# Patient Record
Sex: FEMALE | Race: WHITE | NOT HISPANIC OR LATINO | Employment: UNEMPLOYED | URBAN - METROPOLITAN AREA
[De-identification: names, ages, dates, MRNs, and addresses within clinical notes are randomized per-mention and may not be internally consistent; named-entity substitution may affect disease eponyms.]

---
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
## 2025-01-16 ENCOUNTER — TELEPHONE (OUTPATIENT)
Age: 20
End: 2025-01-16

## 2025-01-16 NOTE — TELEPHONE ENCOUNTER
"Behavioral Health Outpatient Intake Questions    Referred By   : SELF    Please advise interviewee that they need to answer all questions truthfully to allow for best care, and any misrepresentations of information may affect their ability to be seen at this clinic   => Was this discussed? Yes     If Minor Child (under age 18)    Who is/are the legal guardian(s) of the child?     Is there a custody agreement?      If \"YES\"- Custody orders must be obtained prior to scheduling the first appointment  In addition, Consent to Treatment must be signed by all legal guardians prior to scheduling the first appointment    If \"NO\"- Consent to Treatment must be signed by all legal guardians prior to scheduling the first appointment    Behavioral Health Outpatient Intake History -     Presenting Problem (in patient's own words): bad anxiety and panic attacks    Are there any communication barriers for this patient?     No                                               If yes, please describe barriers:   If there is a unique situation, please refer to Spencer Spears/Katja Mendoza for final determination.    Are you taking any psychiatric medications? No     If \"YES\" -What are they      If \"YES\" -Who prescribes?     Has the Patient previously received outpatient Talk Therapy or Medication Management from [facility]  No        If \"YES\"- When, Where and with Whom?         If \"NO\" -Has Patient received these services elsewhere?       If \"YES\" -When, Where, and with Whom?    Has the Patient abused alcohol or other substances in the last 6 months ? No  No concerns of substance abuse are reported.     If \"YES\" -What substance, How much, How often?     If illegal substance: Refer to [facility] (for [facility]) or [facility].   If Alcohol in excess of 10 drinks per week:  Refer to [facility] (for [facility]) or [facility]    Legal History-     Is this treatment court ordered? No   If \"yes \"send to :  Talk Therapy : Send to Spencer Spears " "for final determination   Med Management: Send to Dr. Light for final determination     Has the Patient been convicted of a felony?  No   If \"Yes\" send to -When, What?  Talk Therapy: Send to Spencer Spears for final determination   Med Management: Send to Dr. Light for final determination     ACCEPTED as a patient Yes  If \"Yes\" Appointment Date:   Olivier Toro 4/8 @ 11a    Referred Elsewhere? No  If “Yes” - (Where? Ex: [facility], [facility], [facility], [facility], etc.)       Name of Insurance Co:[facility]  Insurance ID#[id number]   Insurance Phone #  If ins is primary or secondary?  If patient is a minor, parents information such as Name, D.O.B of guarantor.  "

## 2025-01-22 ENCOUNTER — TELEPHONE (OUTPATIENT)
Dept: PSYCHIATRY | Facility: CLINIC | Age: 20
End: 2025-01-22

## 2025-01-22 NOTE — TELEPHONE ENCOUNTER
Forms sent via [email or web address].    Parent/guardian of patient aware that forms should be completed via [email or web address] prior to appt.

## 2025-03-31 ENCOUNTER — OFFICE VISIT (OUTPATIENT)
Dept: FAMILY MEDICINE CLINIC | Facility: CLINIC | Age: 20
End: 2025-03-31

## 2025-03-31 VITALS
HEIGHT: 60 IN | DIASTOLIC BLOOD PRESSURE: 68 MMHG | BODY MASS INDEX: 24.94 KG/M2 | OXYGEN SATURATION: 98 % | SYSTOLIC BLOOD PRESSURE: 102 MMHG | TEMPERATURE: 98.5 F | WEIGHT: 127 LBS | HEART RATE: 111 BPM

## 2025-03-31 DIAGNOSIS — Z02.4 DRIVER'S PERMIT PE (PHYSICAL EXAMINATION): Primary | ICD-10-CM

## 2025-03-31 PROCEDURE — 99499 UNLISTED E&M SERVICE: CPT | Performed by: PHYSICIAN ASSISTANT

## 2025-03-31 NOTE — PROGRESS NOTES
"Name: Kiana Claudio      : 2005      MRN: [id number]  Encounter Provider: Justina Vergara PA-C  Encounter Date: 3/31/2025   Encounter department: [facility]  :  Assessment & Plan  's permit PE (physical examination)  Drivers permit physical form completed.             Depression Screening and Follow-up Plan: Patient was screened for depression during today's encounter. They screened negative with a PHQ-2 score of 0.        History of Present Illness   Kiana is here today for 's permit physical.      Review of Systems   Constitutional:  Negative for chills and fever.   HENT:  Negative for ear pain and sore throat.    Eyes:  Negative for pain and visual disturbance.   Respiratory:  Negative for cough and shortness of breath.    Cardiovascular:  Negative for chest pain and palpitations.   Gastrointestinal:  Negative for abdominal pain and vomiting.   Genitourinary:  Negative for dysuria and hematuria.   Musculoskeletal:  Negative for arthralgias and back pain.   Skin:  Negative for color change and rash.   Neurological:  Negative for seizures and syncope.   All other systems reviewed and are negative.      Objective   /68 (BP Location: Left arm, Patient Position: Sitting)   Pulse (!) 111   Temp 98.5 °F (36.9 °C) (Temporal)   Ht 4' 11.5\" (1.511 m)   Wt 57.6 kg (127 lb)   SpO2 98%   BMI 25.22 kg/m²      Physical Exam  Vitals reviewed.   Constitutional:       General: She is not in acute distress.     Appearance: She is well-developed. She is not diaphoretic.   HENT:      Head: Normocephalic and atraumatic.      Right Ear: Hearing, tympanic membrane, ear canal and external ear normal.      Left Ear: Hearing, tympanic membrane, ear canal and external ear normal.      Nose: Nose normal.      Mouth/Throat:      Mouth: Mucous membranes are moist.      Pharynx: Oropharynx is clear. Uvula midline. No oropharyngeal exudate.   Eyes:      General: No scleral icterus.        Right eye: No " discharge.         Left eye: No discharge.      Conjunctiva/sclera: Conjunctivae normal.   Neck:      Thyroid: No thyromegaly.      Vascular: No carotid bruit.   Cardiovascular:      Rate and Rhythm: Normal rate and regular rhythm.      Heart sounds: Normal heart sounds. No murmur heard.  Pulmonary:      Effort: Pulmonary effort is normal. No respiratory distress.      Breath sounds: Normal breath sounds. No wheezing.   Abdominal:      General: Bowel sounds are normal. There is no distension.      Palpations: Abdomen is soft. There is no mass.      Tenderness: There is no abdominal tenderness. There is no guarding or rebound.   Musculoskeletal:         General: No tenderness. Normal range of motion.      Cervical back: Neck supple.   Lymphadenopathy:      Cervical: No cervical adenopathy.   Skin:     General: Skin is warm and dry.      Findings: No erythema or rash.   Neurological:      Mental Status: She is alert and oriented to person, place, and time.   Psychiatric:         Behavior: Behavior normal.         Thought Content: Thought content normal.         Judgment: Judgment normal.

## 2025-04-04 ENCOUNTER — TELEPHONE (OUTPATIENT)
Age: 20
End: 2025-04-04

## 2025-04-04 NOTE — TELEPHONE ENCOUNTER
Mother of patient called in to confirm the patients new patient medication management appointment on 4/8 @11    Motherjoseyo inquired about the patietn receiving talk therapy services.     Writer stated the intake department would reach out when those services become available to establish.

## 2025-04-08 ENCOUNTER — OFFICE VISIT (OUTPATIENT)
Dept: PSYCHIATRY | Facility: CLINIC | Age: 20
End: 2025-04-08
Payer: COMMERCIAL

## 2025-04-08 DIAGNOSIS — Z13.29 SCREENING FOR ENDOCRINE, NUTRITIONAL, METABOLIC AND IMMUNITY DISORDER: ICD-10-CM

## 2025-04-08 DIAGNOSIS — F41.0 GENERALIZED ANXIETY DISORDER WITH PANIC ATTACKS: ICD-10-CM

## 2025-04-08 DIAGNOSIS — Z13.228 SCREENING FOR ENDOCRINE, NUTRITIONAL, METABOLIC AND IMMUNITY DISORDER: ICD-10-CM

## 2025-04-08 DIAGNOSIS — Z13.21 SCREENING FOR ENDOCRINE, NUTRITIONAL, METABOLIC AND IMMUNITY DISORDER: ICD-10-CM

## 2025-04-08 DIAGNOSIS — F33.1 MODERATE RECURRENT MAJOR DEPRESSION (HCC): Primary | ICD-10-CM

## 2025-04-08 DIAGNOSIS — Z13.0 SCREENING FOR ENDOCRINE, NUTRITIONAL, METABOLIC AND IMMUNITY DISORDER: ICD-10-CM

## 2025-04-08 DIAGNOSIS — F41.1 GENERALIZED ANXIETY DISORDER WITH PANIC ATTACKS: ICD-10-CM

## 2025-04-08 PROCEDURE — 90792 PSYCH DIAG EVAL W/MED SRVCS: CPT | Performed by: PHYSICIAN ASSISTANT

## 2025-04-08 PROCEDURE — 96127 BRIEF EMOTIONAL/BEHAV ASSMT: CPT | Performed by: PHYSICIAN ASSISTANT

## 2025-04-08 RX ORDER — SERTRALINE HYDROCHLORIDE 25 MG/1
25 TABLET, FILM COATED ORAL DAILY
Qty: 30 TABLET | Refills: 1 | Status: SHIPPED | OUTPATIENT
Start: 2025-04-08 | End: 2025-06-07

## 2025-04-08 RX ORDER — HYDROXYZINE HYDROCHLORIDE 25 MG/1
25 TABLET, FILM COATED ORAL EVERY 12 HOURS PRN
Qty: 30 TABLET | Refills: 1 | Status: SHIPPED | OUTPATIENT
Start: 2025-04-08 | End: 2025-06-07

## 2025-04-08 NOTE — BH CRISIS PLAN
Client Name: Kiana Claudio       Client YOB: 2005    Ora Safety Plan      Creation Date: 4/8/25    Created By: Olivier Toro PA-C       Step 1: Warning Signs:   Warning Signs   Isolation            Step 2: Internal Coping Strategies:   Internal Coping Strategies   Listen to music   Reading            Step 3: People and social settings that provide distraction:   Name Contact Information   Mother    Close friends             Step 4: People whom I can ask for help during a crisis:      Name Contact Information    Mother and close friends       Step 5: Professionals or agencies I can contact during a crisis:      Clinican/Agency Name Phone Emergency Contact    Crisis hotline 988     Crisis walk-in clinic        Local Emergency Department Emergency Department Phone Emergency Department Address    [facility]          Crisis Phone Numbers:   Suicide Prevention Lifeline: Call or Text  988 Crisis Text Line: Text HOME to 114-121   Please note: Some [facility] do not have a separate number for Child/Adolescent specific crisis. If your county is not listed under Child/Adolescent, please call the adult number for your county      Adult Crisis Numbers: Child/Adolescent Crisis Numbers   [facility]: [phone] [facility]: [phone]   [facility]: [phone] [facility]: [phone]   [facility]: [phone] [place]: [phone]   [facility]: [phone] [facility]: [phone]   [facility]: [phone]   [facility]: [phone]   [facility]: [phone]   [facility]: [phone] (daytime) [phone] (after hours, weekends, holidays)      Step 6: Making the environment safer (plan for lethal means safety):   Patient did not identify any lethal methods: Yes     Optional: What is most important to me and worth living for?      Ora Safety Plan. Marah Granados and David Freire.  Used with permission of the authors.

## 2025-04-08 NOTE — PSYCH
PSYCHIATRIC EVALUATION     Name: Kiana Claudio      : 2005      MRN: [id number]  Encounter Provider: Olivier Toro PA-C  Encounter Date: 2025   Encounter department: [facility]    Insurance: Payor: [facility] / Plan: [facility] / Product Type: [facility] /      Reason for visit:   Chief Complaint   Patient presents with    Establish Care   :  Assessment & Plan  Moderate recurrent major depression (HCC)  Start Zoloft 25 mg daily.  Risks and potential side effects of medication discussed with patient including serotonin syndrome, SIADH, worsening depression, suicidality, induction of vitor, GI upset, headaches, activation, sexual side effects, sedation, potential drug interactions, and others. Patient expressed understanding.    Patient contracts for safety denies SI.  Safety plan completed.  Treatment plan completed.  Follow-up in 1 month or sooner if needed.  Call with any questions or concerns.  Patient is on psychotherapy wait list.  Orders:    sertraline (Zoloft) 25 mg tablet; Take 1 tablet (25 mg total) by mouth daily    Comprehensive metabolic panel    CBC and differential    TSH, 3rd generation with Free T4 reflex; Future    Vitamin D 25 hydroxy; Future    Generalized anxiety disorder with panic attacks  Start Zoloft 25 mg daily  Atarax as needed  Orders:    sertraline (Zoloft) 25 mg tablet; Take 1 tablet (25 mg total) by mouth daily    hydrOXYzine HCL (ATARAX) 25 mg tablet; Take 1 tablet (25 mg total) by mouth every 12 (twelve) hours as needed for anxiety (Sleep)    Comprehensive metabolic panel    CBC and differential    TSH, 3rd generation with Free T4 reflex; Future    Vitamin D 25 hydroxy; Future    Screening for endocrine, nutritional, metabolic and immunity disorder    Orders:    Comprehensive metabolic panel    CBC and differential    TSH, 3rd generation with Free T4 reflex; Future    Vitamin D 25 hydroxy;  Future        Treatment Recommendations/Precautions:    Educated about diagnosis and treatment modalities. Verbalizes understanding and agreement with the treatment plan.  Discussed self monitoring of symptoms, and symptom monitoring tools.  Discussed medications and if treatment adjustment was needed or desired.  Medication management every 1 month  Aware of 24 hour and weekend coverage for urgent situations accessed by calling [facility] main practice number  I am scheduling this patient out for greater than 3 months: No    Medications Risks/Benefits:      Risks, Benefits And Possible Side Effects Of Medications:    Risks, benefits, and possible side effects of medications explained to Kiana and she (or legal representative) verbalizes understanding and agreement for treatment.    Controlled Medication Discussion:     Not applicable - controlled prescriptions are not prescribed by this practice.      History of Present Illness     Kiana seen today for psychiatric evaluation and medication management.  Patient reporting significant anxiety with panic attacks and depression symptoms for greater than 2 years.  Patient states waxing and waning in nature.  Also reporting some sleep disturbances for approximately 1 year.  Patient reporting completed 1 year of college up in [place] however had to leave school due to cyber bullying.  Patient states was receiving messages to kill herself and take her own life.  Patient states would like to return to school or join the  in the future.  Patient reports anxiety depression are somewhat better than initially during these bullying episodes.  Patient reports no history of psychotropic medications.  Denies any prior psychiatric admissions or visits with psychiatrist.  Reports in the past has had passive SI without plan to hurt herself with last episode greater than 2 months ago.  Denies any suicide attempts.  Denies any family history of  psychiatric issues.  Reports some of her symptoms also occurred when a good friend of hers committed suicide 2 years ago.  Patient is on psychotherapy and the wait list.  Patient states willing to start medications if necessary.    Psychiatric Review Of Systems:    Sleep changes: decreased  Appetite changes: no  Weight changes: no  Energy/anergy: yes, decreased  Interest/pleasure/anhedonia: yes  Somatic symptoms: no  Anxiety/panic: yes  Jazmín: no  Guilty/hopeless: yes  Self injurious behavior/risky behavior: no  Suicidal ideation:  Less passive SI without plan was over 2 months ago.  Homicidal ideation: no  Auditory hallucinations: no   Visual hallucinations: no  Other hallucinations: no  Delusional thinking: no  Eating disorder history: no  Obsessive/compulsive symptoms: no  Pertinent items are noted in HPI; all others negative    Review Of Systems: A review of systems is obtained and is negative except for the pertinent positives listed in HPI/Subjective above.      Current Rating Scores:     See results posted in chart  PHQ-9 Depression Screening    Little interest or pleasure in doing things: 2 - more than half the days  Feeling down, depressed, or hopeless: 2 - more than half the days  Trouble falling or staying asleep, or sleeping too much: 2 - more than half the days  Feeling tired or having little energy: 2 - more than half the days  Poor appetite or overeatin - more than half the days  Feeling bad about yourself - or that you are a failure or have let yourself or your family down: 2 - more than half the days  Trouble concentrating on things, such as reading the newspaper or watching television: 0 - not at all  Moving or speaking so slowly that other people could have noticed. Or the opposite - being so fidgety or restless that you have been moving around a lot more than usual: 0 - not at all  Thoughts that you would be better off dead, or of hurting yourself in some way: 0 - not at all  PHQ-9 Score:  12  Score Interpretation: Moderate depression        DARREN-7 Flowsheet Screening      Flowsheet Row Most Recent Value   Over the last two weeks, how often have you been bothered by the following problems?     Feeling nervous, anxious, or on edge 3   Not being able to stop or control worrying 2   Worrying too much about different things 3   Trouble relaxing  2   Being so restless that it's hard to sit still 1   Becoming easily annoyed or irritable  0   Feeling afraid as if something awful might happen 2   How difficult have these problems made it for you to do your work, take care of things at home, or get along with other people?  Not difficult at all   DARREN Score  13           Areas of Improvement:  Initial evaluation and reviewed in HPI/Subjective Section      Historical Information      Past Psychiatric History:     Past Inpatient Psychiatric Treatment:   No history of past inpatient psychiatric admissions  Past Outpatient Psychiatric Treatment:    No history of past outpatient psychiatric treatment  Past Suicide Attempts: no  Past Violent Behavior: no  Past Psychiatric Medication Trials: none    Traumatic History:     Abuse: Reports cyber bullying while freshman at [employer].  Other Traumatic Events: none    Family Psychiatric History:     Family History   Problem Relation Age of Onset    No Known Problems Mother     No Known Problems Father        Substance Use History:    Tobacco, Alcohol and Drug Use History     Tobacco Use    Smoking status: Never    Smokeless tobacco: Never   Vaping Use    Vaping status: Never Used   Substance Use Topics    Alcohol use: Never    Drug use: Never     Alcohol Use: Not At Risk (8/4/2020)    AUDIT-C     Frequency of Alcohol Consumption: Never     Additional Substance Use Detail:    Alcohol use: denies use  Recreational drug use:   Cocaine: denies use  Heroin: denies use  Cannabis: denies use  Other drugs:   denies use  Longest clean time: not applicable  History of  Inpatient/Outpatient rehabilitation program: no  Smoking history: denies use  Use of caffeine:  Rare    Social History:    Education: some college  Learning Disabilities: none  Marital History: single, never   Children: none  Living Arrangement: lives in home with mother and brother  Occupational History:  [employer]  Functioning Relationships: good support system, supportive network  Legal History: none   History: None  Access to firearms: none    Social History     Socioeconomic History    Marital status: Single     Spouse name: Not on file    Number of children: Not on file    Years of education: Not on file    Highest education level: Not on file   Occupational History    Not on file   Other Topics Concern    Not on file   Social History Narrative    Not on file     No past medical history on file.  No past surgical history on file.  Allergies:   Allergies   Allergen Reactions    Amoxicillin Rash       Current Outpatient Medications   Medication Instructions    hydrOXYzine HCL (ATARAX) 25 mg, Oral, Every 12 hours PRN    sertraline (ZOLOFT) 25 mg, Oral, Daily        Medical History Reviewed by provider this encounter:         Objective   There were no vitals taken for this visit.     Mental Status Evaluation:    Appearance age appropriate, casually dressed   Behavior pleasant, cooperative, mildly anxious   Speech normal rate, normal volume, normal pitch, spontaneous   Mood depressed, anxious   Affect normal range and intensity, appropriate   Thought Processes organized, logical, goal directed, linear   Thought Content no overt delusions   Perceptual Disturbances: no auditory hallucinations, no visual hallucinations   Abnormal Thoughts  Risk Potential Suicidal ideation - None  Homicidal ideation - None  Potential for aggression - No   Orientation oriented to person, place, time/date, and situation   Memory recent and remote memory grossly intact   Consciousness alert and awake   Attention Span  Concentration Span attention span and concentration are age appropriate   Intellect appears to be of average intelligence   Insight intact   Judgement intact   Muscle Strength and  Gait normal muscle strength and normal muscle tone, normal gait and normal balance   Motor activity no abnormal movements   Language no difficulty naming common objects, no difficulty repeating a phrase, no difficulty writing a sentence   Fund of Knowledge adequate knowledge of current events  adequate fund of knowledge regarding past history  adequate fund of knowledge regarding vocabulary    Pain none   Pain Scale 0         Laboratory Results: I have personally reviewed all pertinent laboratory/tests results    Last Visit Labs:   No visits with results within 1 Month(s) from this visit.   Latest known visit with results is:   No results found for any previous visit.       Suicide/Homicide Risk Assessment:    Risk of Harm to Self:  The following ratings are based on assessment at the time of the interview  Based on today's assessment, Kiana presents the following risk of harm to self: minimal    Risk of Harm to Others:  The following ratings are based on assessment at the time of the interview  Based on today's assessment, Kiana presents the following risk of harm to others: none    The following interventions are recommended:  Initiating treatment with Zoloft and hydroxyzine.  Patient is on wait list for psychotherapy.    Treatment Plan:    Completed and signed during the session: Yes - with Kiana.    Depression Follow-up Plan Completed: Yes    Note Share: This note was not shared with the patient due to this is a psychotherapy note    Administrative Statements   Administrative Statements   I have spent a total time of 60 minutes in caring for this patient on the day of the visit/encounter including Prognosis, Risks and benefits of tx options, Instructions for management, Patient and family education, Importance of tx compliance,  Risk factor reductions, Impressions, Counseling / Coordination of care, Documenting in the medical record, Reviewing/placing orders in the medical record (including tests, medications, and/or procedures), and Obtaining or reviewing history  .    Visit Time  Visit Start Time: 11 AM  Visit Stop Time: 12 noon  Total Visit Duration: 60 minutes    Olivier Toro PA-C 04/08/25

## 2025-04-08 NOTE — BH TREATMENT PLAN
TREATMENT PLAN (Medication Management Only)        [facility]    Name and Date of Birth:  Kiana Claudio 19 y.o. 2005  MRN: [id number]  Date of Treatment Plan: April 8, 2025  Diagnosis/Diagnoses:    1. Moderate recurrent major depression (HCC)    2. Generalized anxiety disorder with panic attacks      Strengths/Personal Resources for Self-Care: supportive family, supportive friends, ability to adapt to life changes, ability to communicate needs, ability to communicate well, ability to listen, ability to reason.  Area/Areas of need (in own words): anxiety symptoms, depressive symptoms  1. Long Term Goal:   control anxiety, control depression.  Target Date:6 months - 10/8/2025  Person/Persons responsible for completion of goal: Kiana  2.  Short Term Objective (s) - How will we reach this goal?:   A.  Provider new recommended medication/dosage changes and/or continue medication(s):  Starting therapy with Zoloft and Atarax .  B.  Keep all scheduled appointments..  C.  Take psychiatric medications responsibly..  Target Date:1 month - 5/8/2025  Person/Persons Responsible for Completion of Goal: Kiana  Progress Towards Goals: initiating treatment  Treatment Modality: medication management every 1 month  Review due 180 days from date of this plan: 6 months - 10/8/2025  Expected length of service: ongoing treatment unless revised  My Physician/PA/NP and I have developed this plan together and I agree to work on the goals and objectives. I understand the treatment goals that were developed for my treatment.   Electronic Signatures: on file (unless signed below)    Olivier Toro PA-C 04/08/25

## 2025-05-05 ENCOUNTER — TELEPHONE (OUTPATIENT)
Age: 20
End: 2025-05-05

## 2025-05-05 NOTE — TELEPHONE ENCOUNTER
Patient is calling regarding cancelling an appointment.    Date/Time: 5/6/2025 1:30am & 12:30pm    Reason:     Patient was rescheduled: YES [x] NO []  If yes, when was Patient reschedule for: 5/29/2025 10:30am    Patient requesting call back to reschedule: YES [] NO [x]      Provider was notified via secure chat

## 2025-05-08 ENCOUNTER — TELEPHONE (OUTPATIENT)
Age: 20
End: 2025-05-08

## 2025-05-08 NOTE — TELEPHONE ENCOUNTER
Contacted patient off of [facility]  wait list to verify needs of services in attempts to offer appt. LVM for patient to contact intake dept  in regards to  verify needs of service and update preferences.      Location Pref:  Provider Pref:  Appt  Type Pref:  Insurance verification:      Attempt #1

## 2025-07-17 ENCOUNTER — TELEPHONE (OUTPATIENT)
Dept: FAMILY MEDICINE CLINIC | Facility: CLINIC | Age: 20
End: 2025-07-17

## 2025-07-17 NOTE — TELEPHONE ENCOUNTER
LVM for patient to call office to r/s 8/20/25 annual physical w/Justina - she will be out of the office.

## 2025-07-21 ENCOUNTER — TELEPHONE (OUTPATIENT)
Dept: FAMILY MEDICINE CLINIC | Facility: CLINIC | Age: 20
End: 2025-07-21